# Patient Record
(demographics unavailable — no encounter records)

---

## 2025-01-13 NOTE — REASON FOR VISIT
[Subsequent Evaluation] : a subsequent evaluation for [Ear Infections] : ear infections [Hearing Loss] : hearing loss [Nasal Obstruction] : nasal obstruction [Parents] : parents

## 2025-01-13 NOTE — CONSULT LETTER
[Courtesy Letter:] : I had the pleasure of seeing your patient, [unfilled], in my office today. [Sincerely,] : Sincerely, [FreeTextEntry2] : Dr Tanenr Garcia [address] [place] [address] [FreeTextEntry3] : Osorio Shafer MD Chief, Pediatric Otolaryngology [facility] and [facility] Professor of Otolaryngology [facility] [employer] at [facility]

## 2025-01-13 NOTE — HISTORY OF PRESENT ILLNESS
[No Personal or Family History of Easy Bruising, Bleeding, or Issues with General Anesthesia] : No Personal or Family History of easy bruising, bleeding, or issues with general anesthesia [No change in the review of systems as noted in prior visit date ___] : No change in the review of systems as noted in prior visit date of [unfilled] [de-identified] : Doing well after ear tubes, adenoidectomy and ITR (10/9/2024) No ear infections  No otorrhea No throat infections No snoring at night  No chronic nasal congestion

## 2025-01-13 NOTE — PHYSICAL EXAM
[Placement/Patency] : tympanostomy tube in place and patent [Clear/Ventilated] : middle ear clear and well ventilated [2+] : 2+ [Increased Work of Breathing] : no increased work of breathing with use of accessory muscles and retractions [Normal muscle strength, symmetry and tone of facial, head and neck musculature] : normal muscle strength, symmetry and tone of facial, head and neck musculature [Normal] : no cervical lymphadenopathy [Age Appropriate Behavior] : age appropriate behavior

## 2025-07-14 NOTE — CONSULT LETTER
[Courtesy Letter:] : I had the pleasure of seeing your patient, [unfilled], in my office today. [Sincerely,] : Sincerely, [FreeTextEntry2] : Dr Tanner Garcia [address] [place] [address] [FreeTextEntry3] : Osorio Shafer MD Chief, Pediatric Otolaryngology [facility] and [facility] Professor of Otolaryngology [facility] [employer] at [facility]

## 2025-07-14 NOTE — PROCEDURE
[FreeTextEntry1] : Removal right ear foreign body [FreeTextEntry2] : Foreign body right ear [FreeTextEntry3] : Under operative microscopic visualization the foreign body is removed from the right ear canal with a curette.

## 2025-07-14 NOTE — PHYSICAL EXAM
[Placement/Patency] : tympanostomy tube in place and patent [Clear/Ventilated] : middle ear clear and well ventilated [2+] : 2+ [Increased Work of Breathing] : no increased work of breathing with use of accessory muscles and retractions [Normal muscle strength, symmetry and tone of facial, head and neck musculature] : normal muscle strength, symmetry and tone of facial, head and neck musculature [Normal] : no cervical lymphadenopathy [Age Appropriate Behavior] : age appropriate behavior [FreeTextEntry8] : Foreign body (PET)

## 2025-07-14 NOTE — HISTORY OF PRESENT ILLNESS
[No change in the review of systems as noted in prior visit date ___] : No change in the review of systems as noted in prior visit date of [unfilled] [de-identified] : Doing well after ear tubes, adenoidectomy and ITR (10/9/2024) No ear infections No otorrhea No throat infections No snoring at night No chronic nasal congestion